# Patient Record
(demographics unavailable — no encounter records)

---

## 2025-06-26 NOTE — REVIEW OF SYSTEMS
[Fever] : no fever [Headache] : no headache [Chills] : no chills [Feeling Fatigued] : not feeling fatigued [SOB] : no shortness of breath [Dyspnea on exertion] : not dyspnea during exertion [Chest Discomfort] : chest discomfort [Leg Claudication] : no intermittent leg claudication [Palpitations] : no palpitations [Orthopnea] : no orthopnea [Syncope] : no syncope [Cough] : no cough [Wheezing] : no wheezing [Coughing Up Blood] : no hemoptysis [Dizziness] : no dizziness [Convulsions] : no convulsions [Weakness] : no weakness [Limb Weakness (Paresis)] : no limb weakness (Paresis) [Confusion] : no confusion was observed [Depression] : no depression [Anxiety] : no anxiety [Under Stress] : not under stress [Suicidal] : not suicidal

## 2025-06-26 NOTE — PHYSICAL EXAM
[Well Developed] : well developed [Well Nourished] : well nourished [No Acute Distress] : no acute distress [Normal Venous Pressure] : normal venous pressure [No Carotid Bruit] : no carotid bruit [Normal S1, S2] : normal S1, S2 [No Murmur] : no murmur [No Rub] : no rub [Clear Lung Fields] : clear lung fields [Good Air Entry] : good air entry [No Respiratory Distress] : no respiratory distress  [Normal Gait] : normal gait [No Edema] : no edema [No Cyanosis] : no cyanosis [No Clubbing] : no clubbing [Moves all extremities] : moves all extremities [No Focal Deficits] : no focal deficits [Normal Speech] : normal speech [Alert and Oriented] : alert and oriented [Normal memory] : normal memory [de-identified] : gestational uterus

## 2025-06-26 NOTE — HISTORY OF PRESENT ILLNESS
[FreeTextEntry1] : RITU LOYA is a 34 year y.o. F with medical history significant for 28 wks gestation, anemia. She is here for cardiac evaluation of chest pain  She reports having on and off sharp retrosternal pain for one month (in May). Non-exertional. Occurred when she was supine. Non-radiating.  She went to the ED on 5/30/25. LEV doppler was neg for DVT. Symptoms have subsided since ED visit. She changed her diet. Eating more vegetables.  She reports that a week prior to going to the ED, she had a syncopal episode. That day she woke up early to catch a flight. She was weak and tired. She was fasting and did not drink fluids that morning because she was rushing to the airport. She was standing on-line to check in. She felt LH and her  caught her as she lost consciousness. Not preceded by palpitations or chest pain. She recovered quickly. The ambulance was called, and she was cleared to travel. No recurrent syncope since.   Denies SOB, palpitations, orthopnea, dizziness, syncope, LH, FRENCH, edema Patient denies changes in her exercise tolerance during the past 6 months. She can walk 10 blocks and can climb 3 flights of stairs.  Sleeps with 1 pillow  She denies history of MI, CVA, DM, smoking Denies family history SCD Mother had a stroke at 58 y.o.  No hospitalizations in the past 3 years.    DATA ECG (6/26/25): NSR at 78 bpm w nl axis and no STT abn   LABS (5/30/25): K 4.0; Cre 0.52; D-dimer 251 (h); ALT 53; AST 28; ProBNP 103; Trop <6; eGFR 125; Hgb 10.8; Hct 32.2; Plt 267;   LEV (5/30/25): Neg for DVT

## 2025-07-21 NOTE — ASSESSMENT
[FreeTextEntry1] : RITU LOYA is a 34 year F, 32 wks gestation, anemia (on iron pills). She is here for follow-up of atypical chest pain and a syncopal episode that appears to have either been orthostatic or vagal. ECG is ok. Cardiac exam is normal. Echo showed a structurally normal heart. No recurrent symptoms. BP is normal. She has no cardiac complaints at this time. No need for further cardiac testing at this time.   - I reviewed ECG --> ok - I reviewed echo report  - I reviewed labs - stay hydrated - avoid prolonged standing  - Heart healthy diet low in sodium, sugars and saturated fats and high in fruits, vegetables and whole grains.     Patient advised to go to the nearest ED whenever any symptoms persist and/or worsens.  Patient/Family/Caregiver verbalized complete understanding of these instructions.  I spent a total of 25 minutes with more than 50% spent on counseling and coordinating care.    RTO prn

## 2025-07-21 NOTE — PHYSICAL EXAM
[Well Developed] : well developed [Well Nourished] : well nourished [No Acute Distress] : no acute distress [Normal Venous Pressure] : normal venous pressure [No Carotid Bruit] : no carotid bruit [Normal S1, S2] : normal S1, S2 [No Murmur] : no murmur [No Rub] : no rub [Clear Lung Fields] : clear lung fields [Good Air Entry] : good air entry [No Respiratory Distress] : no respiratory distress  [Normal Gait] : normal gait [No Edema] : no edema [No Cyanosis] : no cyanosis [No Clubbing] : no clubbing [Moves all extremities] : moves all extremities [No Focal Deficits] : no focal deficits [Normal Speech] : normal speech [Alert and Oriented] : alert and oriented [Normal memory] : normal memory [de-identified] : gestational uterus

## 2025-07-21 NOTE — REVIEW OF SYSTEMS
[Fever] : no fever [Headache] : no headache [Chills] : no chills [Feeling Fatigued] : not feeling fatigued [SOB] : no shortness of breath [Dyspnea on exertion] : not dyspnea during exertion [Chest Discomfort] : no chest discomfort [Leg Claudication] : no intermittent leg claudication [Palpitations] : no palpitations [Orthopnea] : no orthopnea [Syncope] : no syncope [Cough] : no cough [Wheezing] : no wheezing [Coughing Up Blood] : no hemoptysis [Dizziness] : no dizziness [Convulsions] : no convulsions [Weakness] : no weakness [Limb Weakness (Paresis)] : no limb weakness (Paresis) [Confusion] : no confusion was observed [Depression] : no depression [Anxiety] : no anxiety [Under Stress] : not under stress [Suicidal] : not suicidal

## 2025-07-21 NOTE — HISTORY OF PRESENT ILLNESS
[FreeTextEntry1] : RITU LOYA is a 34 year y.o. F with medical history significant for 32 wks gestation, anemia. She is here for follow-up of chest pain  Echo showed normal LV function and no valve disease CP has resolved.  She has no cardiac complaints at this time.   She is overall in her usual state of health.  Denies CP, SOB, palpitations, orthopnea, dizziness, syncope, LH, FRENCH, edema Patient denies changes in her exercise tolerance during the past 6 months. She can walk 10 blocks and can climb 3 flights of stairs.  Sleeps with 1 pillow  She denies history of MI, CVA, DM, smoking Denies family history SCD Mother had a stroke at 58 y.o.  No hospitalizations in the past 3 years.    DATA ECG (6/26/25): NSR at 78 bpm w nl axis and no STT abn   LABS (5/30/25): K 4.0; Cre 0.52; D-dimer 251 (h); ALT 53; AST 28; ProBNP 103; Trop <6; eGFR 125; Hgb 10.8; Hct 32.2; Plt 267;   LEV (5/30/25): Neg for DVT  ECHO (7/16/25): Nl LV size and function. EF 66%. No significant valve disease.